# Patient Record
Sex: MALE | Race: ASIAN | NOT HISPANIC OR LATINO | Employment: FULL TIME | URBAN - METROPOLITAN AREA
[De-identification: names, ages, dates, MRNs, and addresses within clinical notes are randomized per-mention and may not be internally consistent; named-entity substitution may affect disease eponyms.]

---

## 2023-04-22 ENCOUNTER — APPOINTMENT (OUTPATIENT)
Dept: RADIOLOGY | Facility: HOSPITAL | Age: 28
End: 2023-04-22
Attending: STUDENT IN AN ORGANIZED HEALTH CARE EDUCATION/TRAINING PROGRAM
Payer: COMMERCIAL

## 2023-04-22 ENCOUNTER — HOSPITAL ENCOUNTER (EMERGENCY)
Facility: HOSPITAL | Age: 28
Discharge: HOME | End: 2023-04-22
Attending: EMERGENCY MEDICINE | Admitting: EMERGENCY MEDICINE
Payer: COMMERCIAL

## 2023-04-22 VITALS
RESPIRATION RATE: 18 BRPM | WEIGHT: 130 LBS | HEART RATE: 97 BPM | OXYGEN SATURATION: 97 % | BODY MASS INDEX: 22.2 KG/M2 | DIASTOLIC BLOOD PRESSURE: 91 MMHG | TEMPERATURE: 98.9 F | SYSTOLIC BLOOD PRESSURE: 125 MMHG | HEIGHT: 64 IN

## 2023-04-22 DIAGNOSIS — S86.002A INJURY OF LEFT ACHILLES TENDON, INITIAL ENCOUNTER: ICD-10-CM

## 2023-04-22 PROCEDURE — 73620 X-RAY EXAM OF FOOT: CPT | Mod: LT

## 2023-04-22 PROCEDURE — 99284 EMERGENCY DEPT VISIT MOD MDM: CPT | Mod: 25

## 2023-04-22 ASSESSMENT — ENCOUNTER SYMPTOMS: ARTHRALGIAS: 1

## 2023-04-22 NOTE — DISCHARGE INSTRUCTIONS
Wear the boot whenever you are up and do not put full weight on your foot.  Just toe-touch only for support/balance.  Follow-up with an orthopedic group of your choice within the next week.  I would recommend calling your insurance and find out which orthopedic group in town is in your network and go there.  I have given you information for [facility].  If the group is not able to offer you an appointment within the week you can go to one of the urgent care sites.  Just look it up on the Internet as both [facility] and [facility] do run orthopedic urgent cares in town that you can walk into.    There is no broken bone seen today.

## 2023-04-22 NOTE — ED PROVIDER NOTES
Emergency Department Encounter     Evaluation Date & Time:   No admission date for patient encounter.    CHIEF COMPLAINT:  Foot Pain      Triage Note:  Patient c/o left foot pain after a fall last night.  Patient stated tripped on the steps .       Triage Assessment     Row Name 04/22/23 1409       Triage Assessment (Adult)    Airway WDL WDL       Respiratory WDL    Respiratory WDL WDL       Skin Circulation/Temperature WDL    Skin Circulation/Temperature WDL WDL       Cardiac WDL    Cardiac WDL WDL       Peripheral/Neurovascular WDL    Peripheral Neurovascular WDL WDL       Cognitive/Neuro/Behavioral WDL    Cognitive/Neuro/Behavioral WDL WDL                  FINAL IMPRESSION:    ICD-10-CM    1. Injury of left Achilles tendon, initial encounter  S86.002A Ankle[employer] DME Walking Boot; Left; Non-pneumatic          Impression and Plan     ED COURSE & MEDICAL DECISION MAKING:  3:15 PM I met with the patient and his family member in triage, obtained history, performed an initial exam, and updated them on XR results. We discussed the plan for discharge with crutches and Orthopedics follow up, which he is in agreement with. We reviewed supportive cares, symptomatic treatment, outpatient follow up, and reasons to return to the Emergency Department.     ED Course as of 04/22/23 1549   Sat Apr 22, 2023   1530 He has pain located immediately over his Achilles tendon but there is no redness, swelling, or inflammation there and functionally, the tendon is intact.  However, cannot rule out underlying partial tear of the area although I think that is less likely given the lack of swelling, but he did have injury to it so it is possible.  Also this may be simple sprain.  However, the remaining ankle really is nontender so since his pain is isolated to his Achilles area, I am going to put him in a walking boot have him not walk on it but instead use crutches and toe-touch until he is seen in follow-up for repeat  evaluation by orthopedics sometime within the next week.  Patient is comfortable with that plan.  He will call his insurance to find out which orthopedic group is in network for him and follow-up directly with them.  He was made aware of the urgent cares in the area that if they were not able to offer him an appointment within the next couple of weeks that he would be able to go directly to the urgent care.       At the conclusion of the encounter I discussed the results of all the tests and the disposition. The questions were answered. The patient or family acknowledged understanding and was agreeable with the care plan.          0 minutes of critical care time        MEDICATIONS GIVEN IN THE EMERGENCY DEPARTMENT:  Medications - No data to display    NEW PRESCRIPTIONS STARTED AT TODAY'S ED VISIT:  New Prescriptions    No medications on file       HPI     The history is provided by the patient.        Johnny Jean is a 28 year old male with no significant medical history who presents to this ED via private vehicle with family member for evaluation of ankle pain.    Patient reports left posterior ankle pain that began last night after tripping at a concert and twisting his leg. He has been unable to bear weight on the LLE since that time. His pain is alleviated at rest and exacerbated with movement. Patient otherwise denies any other symptoms or concerns at this time.      REVIEW OF SYSTEMS:  Review of Systems   Musculoskeletal: Positive for arthralgias (left posterior ankle).   All other systems reviewed and are negative.    remainder of systems are all otherwise negative.         Medical History     No past medical history on file.    No past surgical history on file.    No family history on file.         No current outpatient medications on file.      Physical Exam     First Vitals:  Patient Vitals for the past 24 hrs:   BP Temp Temp src Pulse Resp SpO2 Height Weight   04/22/23 1406 138/88 98.9  F (37.2  C) Oral  "(!) 134 20 95 % 1.626 m (5' 4\") 59 kg (130 lb)       PHYSICAL EXAM:   Constitutional: Sitting upright in chair and talking.    HENT:  Normocephalic  Eyes:  PERRL, EOMI, Conjunctiva normal, No discharge, no scleral icterus.  Respiratory:  Breathing easily  Cardiovascular:  Regular rate and rhythm. Peripheral pulses dp, pt, and radial are wnl.  Musculoskeletal: Moves all extremities.  No erythematous or swollen major joints. Toes are warm with capillary refill at 3 seconds. Achilles is palpated and posterior to ankle and seems intact. Squeezing the gastrocnemius causes the foot to plantar flex. Able to extend ankle.   Integument: Normal coloring.  Lymphatic:  No cervical lymphadenopathy  Neurologic:  Alert & oriented x 3, Normal motor function, Normal sensory function, No focal deficits noted. Normal speech.  Psychiatric:  Affect normal, Judgment normal, Mood normal.     Results     RADIOLOGY:  All pertinent labs reviewed and interpreted  Results for orders placed or performed during the hospital encounter of 04/22/23   XR Foot Left 2 Views     Status: None    Narrative    EXAM: XR FOOT LEFT 2 VIEWS  LOCATION: [facility]  DATE/TIME: 4/22/2023 3:12 PM CDT    INDICATION: Pain.  COMPARISON: None.      Impression    IMPRESSION: AP and lateral views of the left foot demonstrate no acute fracture or dislocation. Normal joint spaces. 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         PROCEDURES:  Procedures:      [facility] Documentation     Medical Decision Making    History:    Supplemental history from: N/A    External Record(s) reviewed: Outpatient Record    Work Up:    Chart documentation includes differential considered and any EKGs or imaging independently interpreted by provider, where specified.    In additional to work up documented, I considered the following work up: Documented in chart, if applicable.    External consultation:    Discussion of management with another provider: " Documented in chart, if applicable    Complicating factors:    Care impacted by chronic illness: N/A    Care affected by social determinants of health: N/A    Disposition considerations: Discharge. No recommendations on prescription strength medication(s). See documentation for any additional details.      I, Dyan Gonzalez, am serving as a scribe to document services personally performed by Roopa Dial MD based on my observation and the provider's statements to me. I, Roopa Dial MD attest that Dyan Gonzalez is acting in a scribe capacity, has observed my performance of the services and has documented them in accordance with my direction. This document has been checked and approved by Roopa Dial MD.    Roopa Dial MD  Emergency Medicine  [facility] EMERGENCY DEPARTMENT        Roopa Dial MD  04/22/23 8642